# Patient Record
(demographics unavailable — no encounter records)

---

## 2025-06-12 NOTE — ASSESSMENT
[FreeTextEntry1] : Fractured ribs 9 through 11 after fall on the tub 2 weeks ago.  No evidence of respiratory distress or issue.  Continue with pulmonary toilet and careful monitoring.  Advised that she work from home as she does have a long commute.  Follow-up in a month and repeat x-rays.  I have refilled the tramadol prescription at her request.  Side effects reviewed.  The patient's current medication management of their orthopedic diagnosis was reviewed today: There is a moderate risk of morbidity with further treatment, especially from use of prescription strength medications and possible side effects of these medications which include upset stomach with oral medications, skin reactions to topical medications and cardiac/renal/diabetes issues with long term use.

## 2025-06-12 NOTE — IMAGING
[Right] : right rib [The fracture is in acceptable alignment. There is progression in healing seen] : The fracture is in acceptable alignment. There is progression in healing seen [de-identified] : Speaks in full sentences Tenderness palpation over lower ribs right side midline Skin intact

## 2025-06-12 NOTE — HISTORY OF PRESENT ILLNESS
[7] : 7 [] : yes [de-identified] : 6/12/2025 PT PRESENTS WITH RT RIB BREAK AND PAIN- TAKING PAINKILLERS PER PA [FreeTextEntry3] : 5/28/2025 [FreeTextEntry5] : FELL IN SHOWER

## 2025-07-17 NOTE — ASSESSMENT
[FreeTextEntry1] : Improving clinically.  X-rays stable.  Gradually return to all activities as tolerated by pain.  She will contact me if any new issues arise.

## 2025-07-17 NOTE — HISTORY OF PRESENT ILLNESS
[7] : 7 [] : yes [de-identified] : 6/12/2025 PT PRESENTS WITH RT RIB BREAK AND PAIN- TAKING PAINKILLERS PER PA  07/17/2025 Pt is here for fu of Rt rib fx. Pt reports improvement however she still has pain with certain movements. No longer has pain with talking and laughing. Pain level: 5. No other changes. [FreeTextEntry3] : 5/28/2025 [FreeTextEntry5] : FELL IN SHOWER

## 2025-07-17 NOTE — IMAGING
[de-identified] : Mild tenderness palpation over lower ribs on the right side Speaks in full sentences Skin intact [Right] : right rib [Fracture] : Fracture